# Patient Record
Sex: MALE | Race: WHITE | NOT HISPANIC OR LATINO | Employment: FULL TIME | URBAN - METROPOLITAN AREA
[De-identification: names, ages, dates, MRNs, and addresses within clinical notes are randomized per-mention and may not be internally consistent; named-entity substitution may affect disease eponyms.]

---
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
## 2022-01-17 DIAGNOSIS — F90.9 ATTENTION DEFICIT HYPERACTIVITY DISORDER (ADHD), UNSPECIFIED ADHD TYPE: ICD-10-CM

## 2022-01-18 RX ORDER — METHYLPHENIDATE HYDROCHLORIDE 36 MG/1
TABLET ORAL
Qty: 60 TABLET | Refills: 0 | Status: SHIPPED | OUTPATIENT
Start: 2022-01-18 | End: 2022-02-10

## 2022-01-18 NOTE — TELEPHONE ENCOUNTER
Pending Prescriptions:                       Disp   Refills    methylphenidate (CONCERTA) 36 MG CR [id number] tab*0            Sig: [METHYLPHENIDATE HCL 36 MG CR TABLET] Take 2 tabs           PO daily    Last visit 8/10/21  Last fill 12/21/21

## 2022-02-10 ENCOUNTER — VIRTUAL VISIT (OUTPATIENT)
Dept: PEDIATRICS | Facility: CLINIC | Age: 22
End: 2022-02-10
Payer: COMMERCIAL

## 2022-02-10 DIAGNOSIS — F90.9 ATTENTION DEFICIT HYPERACTIVITY DISORDER (ADHD), UNSPECIFIED ADHD TYPE: Primary | ICD-10-CM

## 2022-02-10 DIAGNOSIS — F41.9 ANXIETY: ICD-10-CM

## 2022-02-10 PROCEDURE — 99213 OFFICE O/P EST LOW 20 MIN: CPT | Mod: GT | Performed by: PEDIATRICS

## 2022-02-10 RX ORDER — METHYLPHENIDATE HYDROCHLORIDE 10 MG/1
TABLET ORAL
Qty: 60 TABLET | Refills: 0 | Status: SHIPPED | OUTPATIENT
Start: 2022-02-10 | End: 2022-04-21

## 2022-02-10 RX ORDER — METHYLPHENIDATE HYDROCHLORIDE 36 MG/1
TABLET ORAL
Qty: 60 TABLET | Refills: 0 | Status: SHIPPED | OUTPATIENT
Start: 2022-02-10 | End: 2022-04-21

## 2022-02-10 ASSESSMENT — ANXIETY QUESTIONNAIRES
3. WORRYING TOO MUCH ABOUT DIFFERENT THINGS: NOT AT ALL
1. FEELING NERVOUS, ANXIOUS, OR ON EDGE: SEVERAL DAYS
6. BECOMING EASILY ANNOYED OR IRRITABLE: NOT AT ALL
GAD7 TOTAL SCORE: 2
2. NOT BEING ABLE TO STOP OR CONTROL WORRYING: NOT AT ALL
5. BEING SO RESTLESS THAT IT IS HARD TO SIT STILL: SEVERAL DAYS
4. TROUBLE RELAXING: NOT AT ALL
7. FEELING AFRAID AS IF SOMETHING AWFUL MIGHT HAPPEN: NOT AT ALL
IF YOU CHECKED OFF ANY PROBLEMS ON THIS QUESTIONNAIRE, HOW DIFFICULT HAVE THESE PROBLEMS MADE IT FOR YOU TO DO YOUR WORK, TAKE CARE OF THINGS AT HOME, OR GET ALONG WITH OTHER PEOPLE: NOT DIFFICULT AT ALL

## 2022-02-10 ASSESSMENT — PATIENT HEALTH QUESTIONNAIRE - PHQ9: SUM OF ALL RESPONSES TO PHQ QUESTIONS 1-9: 2

## 2022-02-10 NOTE — PATIENT INSTRUCTIONS
No changes to medications today    For anxiety - continue on Sertraline 50 mg daily    For ADHD, continue on Concerta 72 mg daily on work days, and if needed, short acting methylphenidate 2 tabs (20 mg)     If all is going well, next med check in six months   Please return sooner with any concerns

## 2022-02-10 NOTE — PROGRESS NOTES
Berry is a 21 year old who is being evaluated via a billable video visit.      How would you like to obtain your AVS? MyChart  If the video visit is dropped, the invitation should be resent by: Text to cell phone: [phone]  Will anyone else be joining your video visit? No      Video Start Time: 5:26 PM    Assessment & Plan     (F90.9) Attention deficit hyperactivity disorder (ADHD), unspecified ADHD type  (primary encounter diagnosis)  Plan: methylphenidate (RITALIN) 10 MG tablet,         methylphenidate (CONCERTA) 36 MG CR tablet    Berry is doing well  Continue on same medication - Concerta 72 mg daily on work days or change to short acting methylphenidate 20 mg on days when only needs meds on board for shorter period of time - depending upon day's activities    (F41.9) Anxiety    Berry feels he is doing well  No changes today  Continue Sertraline 50 mg daily    If all is going well, next med check in 6 months (sooner PRN)      18 minutes spent on the date of the encounter doing chart review, patient visit, documentation and discussion with family        See Patient Instructions    Return in about 6 months (around 8/10/2022).    Luiza Orourke MD  [facility]   Berry is a 21 year old who presents for the following health issues  accompanied by his mother.    HPI   Video visit today to discuss anxiety and ADHD  Last med check was about six months ago  At that time he was doing well in terms of anxiety and we made no changes (Sertraline 50 mg daily)  In terms of ADHD, he was also doing well and we made no changes (Concerta 72 mg daily for days when long-acting needed, and other days if only short acting needed, take short acting methylphenidate 20 mg    Working at [employer]  Planning to go to a program that requires a full year of work experience  Going to program to be  at a assisted or [employer]     In terms of anxiety, is still taking  Sertraline 50 mg daily  Feels like it works well for him still  Overall pretty happy most of the time   Also feels like he is not too anxious - just the same as most people    In terms of ADHD, usually takes Concerta before work  Starts work at 4:30 in afternoon so takes the Concerta around 3:50  Works 12 hour shift until 4:30am    On days when he doesn't have work, he sometimes doesn't take any medication and sometimes takes the short acting methylphenidate  Feels as though these medications are both working well for him still     When he's home and doesn't take the medication, mom definitely notices that he has a lot of energy but even so         Objective           Vitals:  No vitals were obtained today due to virtual visit.    Physical Exam   GENERAL: Healthy, alert and no distress  EYES: Eyes grossly normal to inspection.  No discharge or erythema, or obvious scleral/conjunctival abnormalities.  SKIN: Visible skin clear. No significant rash, abnormal pigmentation or lesions.  NEURO: Cranial nerves grossly intact.  Mentation and speech appropriate for age.  PSYCH: Mentation appears normal, affect normal/bright, judgement and insight intact, normal speech and appearance well-groomed.              Video-Visit Details    Type of service:  Video Visit    Video End Time:5:41 PM    Originating Location (pt. Location): Home    Distant Location (provider location):  [facility]     Platform used for Video Visit: [employer]

## 2022-02-11 ASSESSMENT — ANXIETY QUESTIONNAIRES: GAD7 TOTAL SCORE: 2

## 2022-04-03 ENCOUNTER — HEALTH MAINTENANCE LETTER (OUTPATIENT)
Age: 22
End: 2022-04-03

## 2022-04-20 DIAGNOSIS — F90.9 ATTENTION DEFICIT HYPERACTIVITY DISORDER (ADHD), UNSPECIFIED ADHD TYPE: ICD-10-CM

## 2022-04-21 RX ORDER — METHYLPHENIDATE HYDROCHLORIDE 10 MG/1
TABLET ORAL
Qty: 60 TABLET | Refills: 0 | Status: SHIPPED | OUTPATIENT
Start: 2022-04-21 | End: 2023-06-29

## 2022-04-21 RX ORDER — METHYLPHENIDATE HYDROCHLORIDE 36 MG/1
TABLET ORAL
Qty: 60 TABLET | Refills: 0 | Status: SHIPPED | OUTPATIENT
Start: 2022-04-21 | End: 2022-06-22

## 2022-06-21 DIAGNOSIS — F90.9 ATTENTION DEFICIT HYPERACTIVITY DISORDER (ADHD), UNSPECIFIED ADHD TYPE: ICD-10-CM

## 2022-06-21 NOTE — TELEPHONE ENCOUNTER
Refill request for medication: Pending Prescriptions:                       Disp   Refills    methylphenidate (CONCERTA) 36 MG CR [id number] tab*0            Sig: [METHYLPHENIDATE HCL 36 MG CR TABLET] Take 2 tabs           PO daily      Last visit addressing this medication: 2/10/22  Follow up plan 6  months  Last refill on 4/21/22, quantity #60   CSA completed 10/10/2019  Date PDMP was last reviewed 9/7/21    Appointment: Not due   Appointment recommended at least every 3 months for opioid prescriptions. Appointment recommended at least every 6 months for ADHD medications.    SINCERE LOPEZ on 6/21/2022 at 9:09 AM

## 2022-06-22 RX ORDER — METHYLPHENIDATE HYDROCHLORIDE 36 MG/1
TABLET ORAL
Qty: 60 TABLET | Refills: 0 | Status: SHIPPED | OUTPATIENT
Start: 2022-06-22 | End: 2022-08-11

## 2022-06-22 NOTE — TELEPHONE ENCOUNTER
Mom calling back patient out of medications.  Refill sent through yesterday for covering for [place].  Please advise. SINCERE LOPEZ on 6/22/2022 at 7:00 AM

## 2022-08-10 DIAGNOSIS — F90.9 ATTENTION DEFICIT HYPERACTIVITY DISORDER (ADHD), UNSPECIFIED ADHD TYPE: ICD-10-CM

## 2022-08-10 NOTE — TELEPHONE ENCOUNTER
Last visit addressing this medication: 2/10/22  Follow up plan 6  months  Last refill on 06/22/2022 , quantity #60   CSA completed 10/10/2019  Date PDMP was last reviewed 9/7/21

## 2022-08-11 RX ORDER — METHYLPHENIDATE HYDROCHLORIDE 36 MG/1
TABLET ORAL
Qty: 60 TABLET | Refills: 0 | Status: SHIPPED | OUTPATIENT
Start: 2022-08-11 | End: 2022-10-18

## 2022-08-11 NOTE — TELEPHONE ENCOUNTER
Please let Berry / mom know that I sent in his refill for Concerta but he is also due now for a follow-up visit.    I would like to help him to please establish care with an adult provider.  He could see Gerardo Campbell or  nurse practitioners at [facility] and Beena Pritchett.  Please help him get scheduled with one of these providers - thank you

## 2022-09-16 DIAGNOSIS — F41.9 ANXIETY: ICD-10-CM

## 2022-09-16 NOTE — TELEPHONE ENCOUNTER
"Last Written Prescription Date:  11/21/21  Last Fill Quantity: 90,  # refills: 2   Last office visit provider:  2/10/22     Requested Prescriptions   Pending Prescriptions Disp Refills     sertraline (ZOLOFT) 50 MG tablet [Pharmacy Med Name: SERTRALINE HCL 50 MG TABLET] 90 tablet 2     Sig: TAKE 1 TABLET BY MOUTH EVERY DAY       SSRIs Protocol Passed - 9/16/2022 12:49 AM        Passed - Recent (12 mo) or future (30 days) visit within the authorizing provider's specialty     Patient has had an office visit with the authorizing provider or a provider within the authorizing providers department within the previous 12 mos or has a future within next 30 days. See \"Patient Info\" tab in inbasket, or \"Choose Columns\" in Meds & Orders section of the refill encounter.              Passed - Medication is active on med list        Passed - Patient is age 18 or older             Estrellita Browning RN 09/16/22 2:05 PM  "

## 2022-10-01 ENCOUNTER — HEALTH MAINTENANCE LETTER (OUTPATIENT)
Age: 22
End: 2022-10-01

## 2022-10-18 DIAGNOSIS — F90.9 ATTENTION DEFICIT HYPERACTIVITY DISORDER (ADHD), UNSPECIFIED ADHD TYPE: ICD-10-CM

## 2022-10-18 RX ORDER — METHYLPHENIDATE HYDROCHLORIDE 36 MG/1
TABLET ORAL
Qty: 60 TABLET | Refills: 0 | Status: SHIPPED | OUTPATIENT
Start: 2022-10-18 | End: 2022-12-27

## 2022-11-17 ENCOUNTER — VIRTUAL VISIT (OUTPATIENT)
Dept: PEDIATRICS | Facility: CLINIC | Age: 22
End: 2022-11-17
Payer: COMMERCIAL

## 2022-11-17 DIAGNOSIS — F90.9 ATTENTION DEFICIT HYPERACTIVITY DISORDER (ADHD), UNSPECIFIED ADHD TYPE: Primary | ICD-10-CM

## 2022-11-17 DIAGNOSIS — F41.9 ANXIETY: ICD-10-CM

## 2022-11-17 PROCEDURE — 99214 OFFICE O/P EST MOD 30 MIN: CPT | Mod: GT | Performed by: PEDIATRICS

## 2022-11-17 ASSESSMENT — ANXIETY QUESTIONNAIRES
GAD7 TOTAL SCORE: 2
1. FEELING NERVOUS, ANXIOUS, OR ON EDGE: SEVERAL DAYS
5. BEING SO RESTLESS THAT IT IS HARD TO SIT STILL: SEVERAL DAYS
GAD7 TOTAL SCORE: 2
4. TROUBLE RELAXING: NOT AT ALL
6. BECOMING EASILY ANNOYED OR IRRITABLE: NOT AT ALL
7. FEELING AFRAID AS IF SOMETHING AWFUL MIGHT HAPPEN: NOT AT ALL
GAD7 TOTAL SCORE: 2
7. FEELING AFRAID AS IF SOMETHING AWFUL MIGHT HAPPEN: NOT AT ALL
2. NOT BEING ABLE TO STOP OR CONTROL WORRYING: NOT AT ALL
3. WORRYING TOO MUCH ABOUT DIFFERENT THINGS: NOT AT ALL

## 2022-11-17 ASSESSMENT — PATIENT HEALTH QUESTIONNAIRE - PHQ9
10. IF YOU CHECKED OFF ANY PROBLEMS, HOW DIFFICULT HAVE THESE PROBLEMS MADE IT FOR YOU TO DO YOUR WORK, TAKE CARE OF THINGS AT HOME, OR GET ALONG WITH OTHER PEOPLE: NOT DIFFICULT AT ALL
SUM OF ALL RESPONSES TO PHQ QUESTIONS 1-9: 3
SUM OF ALL RESPONSES TO PHQ QUESTIONS 1-9: 3

## 2022-11-17 NOTE — PATIENT INSTRUCTIONS
No changes today  For ADHD - continue taking Concerta 72 mg in the morning on days when you need it (ok to skip this medication if not needed)  Also continue to use short acting methylphenidate instead of the Concerta if you only need medication support for a short time (up to 3 hours usually)    For anxiety - will continue on Sertraline 50 mg daily  We talked about that it would be best to take this medication every day - I would encourage you to try to remember to take it daily as this will work best over time  Refills sent to pharmacy    If all is going well, next visit in 6 months  We talked about transition to an adult provider    Providers who see adults and are currently taking new patients:  Dr. Gerardo Campbell - med/peds at [facility]   Beena Pritchett - NP at [facility]   Dr. Claire Smith - DEBBIE at [facility]   Kena Cast - KEVIN at [facility]

## 2022-11-17 NOTE — PROGRESS NOTES
Answers for HPI/ROS submitted by the patient on 11/17/2022  If you checked off any problems, how difficult have these problems made it for you to do your work, take care of things at home, or get along with other people?: Not difficult at all  PHQ9 TOTAL SCORE: 3  CARI 7 TOTAL SCORE: 2  What is the reason for your visit today? : MED Check  How many servings of fruits and vegetables do you eat daily?: 2-3  On average, how many sweetened beverages do you drink each day (Examples: soda, juice, sweet tea, etc.  Do NOT count diet or artificially sweetened beverages)?: 1  How many minutes a day do you exercise enough to make your heart beat faster?: 30 to 60  How many days a week do you exercise enough to make your heart beat faster?: 3 or less  How many days per week do you miss taking your medication?: 3  What makes it hard for you to take your medication every day?: stan Smart is a 22 year old who is being evaluated via a billable video visit.      How would you like to obtain your AVS? TapFunderhart  If the video visit is dropped, the invitation should be resent by: Text to cell phone: [phone]  Will anyone else be joining your video visit? mother          Assessment & Plan     (F90.9) Attention deficit hyperactivity disorder (ADHD), unspecified ADHD type  (primary encounter diagnosis)  For ADHD - continue taking Concerta 72 mg in the morning on days when you need it (ok to skip this medication if not needed)  Also continue to use short acting methylphenidate instead of the Concerta if you only need medication support for a short time (up to 3 hours usually)    (F41.9) Anxiety  Plan: sertraline (ZOLOFT) 50 MG tablet  For anxiety - will continue on Sertraline 50 mg daily  We talked about that it would be best to take this medication every day - I would encourage you to try to remember to take it daily as this will work best over time  Refills sent to pharmacy    If all is going well, next visit in 6 months  We talked  about transition to an adult provider    Providers who see adults:  Dr. Gerardo Campbell - med/peds at [facility]   Beena Pritchett - NP at [facility]   Dr. Claire Smith - FM at [facility]   Kena Cast - NP at [facility]           30 minutes spent on the date of the encounter doing chart review, patient visit and documentation        See Patient Instructions    Return in about 6 months (around 5/17/2023).    Luiza Orourke MD  [facility] DANIELA Smart is a 22 year old accompanied by his mother, presenting for the following health issues:  No chief complaint on file.      History of Present Illness       Reason for visit:  MED Check    He eats 2-3 servings of fruits and vegetables daily.He consumes 1 sweetened beverage(s) daily.He exercises with enough effort to increase his heart rate 30 to 60 minutes per day.  He exercises with enough effort to increase his heart rate 3 or less days per week. He is missing 3 dose(s) of medications per week.  He is not taking prescribed medications regularly due to other.    Today's PHQ-9         PHQ-9 Total Score: 3    PHQ-9 Q9 Thoughts of better off dead/self-harm past 2 weeks :   Not at all    How difficult have these problems made it for you to do your work, take care of things at home, or get along with other people: Not difficult at all  Today's CARI-7 Score: 2     Video visit today for follow-up related to ADHD and anxiety  Currently taking Concerta 72 mg in the morning  On days when there is not much going on, he will sometimes use short acting methylphenidate 10-20 mg    Overall he is doing well with this but does struggle with appetite suppression so sometimes doesn't take hte medicine on days when he wants to eat more  Take the Concerta always on work days (3 days per week) plus another 1-2 other days as well  On other days he either takes no medicaiton or sometimes takes the short acting medication  occasionally  Still feeling like the Concerta 72 mg is working well    For anxiety - continues on Sertraline 50 mg daily    Last med check was Feb 2022 (9 months ago) - at that time he was doing well and we made no changes    For anxiety, still taking sertraline 50 mg - probably takes 4-5 days a week - sometimes he forgets  Doesn't notice a big change if he forgets the medication    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    Physical Exam     GENERAL: Healthy, alert and no distress  EYES: Eyes grossly normal to inspection.  No discharge or erythema, or obvious scleral/conjunctival abnormalities.  RESP: No audible wheeze, cough, or visible cyanosis.  No visible retractions or increased work of breathing.    SKIN: No visible rash or other skin changes.  NEURO: Cranial nerves grossly intact.  Mentation and speech appropriate for age.  PSYCH: Mentation appears normal, affect normal/bright, judgement and insight intact, normal speech and appearance well-groomed.        Video-Visit Details    Video Start Time: 3:21    Type of service:  Video Visit    Video End Time:3:40 PM    Originating Location (pt. Location): Home        Distant Location (provider location):  On-site    Platform used for Video Visit: Jasmin Orourke MD

## 2022-12-27 DIAGNOSIS — F90.9 ATTENTION DEFICIT HYPERACTIVITY DISORDER (ADHD), UNSPECIFIED ADHD TYPE: ICD-10-CM

## 2022-12-27 RX ORDER — METHYLPHENIDATE HYDROCHLORIDE 36 MG/1
TABLET ORAL
Qty: 60 TABLET | Refills: 0 | Status: SHIPPED | OUTPATIENT
Start: 2022-12-27 | End: 2023-02-14

## 2022-12-27 NOTE — TELEPHONE ENCOUNTER
Refill request for medication: Pending Prescriptions:                       Disp   Refills    methylphenidate (CONCERTA) 36 MG CR [id number] tab*0            Sig: [METHYLPHENIDATE HCL 36 MG CR TABLET] Take 2 tabs           PO daily      Last visit addressing this medication: 11/17/22  Follow up plan 6  months  Last refill on 10/18/22, quantity #60   CSA completed 10/10/2019  Date PDMP was last reviewed 8/11/22    Appointment: Not due   Appointment recommended at least every 3 months for opioid prescriptions. Appointment recommended at least every 6 months for ADHD medications.    SINCERE LOPEZ on 12/27/2022 at 8:16 AM

## 2023-02-14 DIAGNOSIS — F90.9 ATTENTION DEFICIT HYPERACTIVITY DISORDER (ADHD), UNSPECIFIED ADHD TYPE: ICD-10-CM

## 2023-02-14 RX ORDER — METHYLPHENIDATE HYDROCHLORIDE 36 MG/1
TABLET ORAL
Qty: 60 TABLET | Refills: 0 | Status: SHIPPED | OUTPATIENT
Start: 2023-02-14 | End: 2023-03-28

## 2023-02-14 NOTE — TELEPHONE ENCOUNTER
Please let Berry know that refill was sent to pharmacy.   Also - please remind him to please schedule a follow-up visit with an adult provider - he will be due to be seen in May.  Thanks!    Providers who see adults:  Dr. Gerardo Campbell - bucky/peds at [facility]   Beena Pritchett - NP at [facility]   Dr. Claire Smith - DEBBIE at [facility]   Kena Cast - KEVIN at [facility]

## 2023-02-14 NOTE — TELEPHONE ENCOUNTER
Last visit addressing this medication: 11/17/22  Follow up plan 6  months  Last refill on 112/27/2022, quantity #60   CSA completed 10/10/2019

## 2023-03-28 DIAGNOSIS — F90.9 ATTENTION DEFICIT HYPERACTIVITY DISORDER (ADHD), UNSPECIFIED ADHD TYPE: ICD-10-CM

## 2023-03-28 RX ORDER — METHYLPHENIDATE HYDROCHLORIDE 36 MG/1
TABLET ORAL
Qty: 60 TABLET | Refills: 0 | Status: SHIPPED | OUTPATIENT
Start: 2023-03-28 | End: 2023-12-11

## 2023-03-28 NOTE — TELEPHONE ENCOUNTER
Refill request for medication: Pending Prescriptions:                       Disp   Refills    methylphenidate (CONCERTA) 36 MG CR [id number] tab*0            Sig: [METHYLPHENIDATE HCL 36 MG CR TABLET] Take 2 tabs           PO daily      Last visit addressing this medication: 11/17/22  Follow up plan 6  months  Last refill on 2/14/23, quantity #60   CSA completed 10/10/2019  Date PDMP was last reviewed 2/14/23    Appointment: Not due   Appointment recommended at least every 3 months for opioid prescriptions. Appointment recommended at least every 6 months for ADHD medications.    SINCERE LOPEZ on 3/28/2023 at 8:02 AM

## 2023-05-14 ENCOUNTER — HEALTH MAINTENANCE LETTER (OUTPATIENT)
Age: 23
End: 2023-05-14

## 2023-05-22 DIAGNOSIS — L70.0 ACNE VULGARIS: Primary | ICD-10-CM

## 2023-05-23 NOTE — TELEPHONE ENCOUNTER
05/23/23  LM for pt to schedule an appt to establish care with an open provider: Mitzy Gomez, Beena Pritchett, Carmen Peña.  Tiana

## 2023-05-23 NOTE — TELEPHONE ENCOUNTER
I received a refill request for topical Clina lotion for Berry but I don't see that I've prescribed this medication for him recently.  He should please schedule a visit with an adult provider to discuss this refill - I had also recommended that he establish care with an adult provider for ADHD management and I don't see an appointment for that scheduled yet - please let Berry know that he needs to be seen and help him get scheduled with an adult provider.  Thanks.

## 2023-05-23 NOTE — TELEPHONE ENCOUNTER
"Routing refill request to provider for review/approval because:  Medication is reported/historical    Last office visit provider:  11/17/2022     Requested Prescriptions   Pending Prescriptions Disp Refills     clindamycin (CLEOCIN T) 1 % external lotion [Pharmacy Med Name: CLINDAMYCIN PHOSP 1% LOTION] 60 mL 5     Sig: APPLY TO AFFECTED AREA 1 TIME DAILY       Topical Acne Medications Protocol Failed - 5/22/2023  1:16 PM        Failed - Medication is active on med list        Passed - Patient is 12 years of age or older        Passed - Recent (12 mo) or future (30 days) visit within the authorizing provider's specialty     Patient has had an office visit with the authorizing provider or a provider within the authorizing providers department within the previous 12 mos or has a future within next 30 days. See \"Patient Info\" tab in inbasket, or \"Choose Columns\" in Meds & Orders section of the refill encounter.                   Holly Davidson RN 05/23/23 12:27 PM  "

## 2023-05-24 NOTE — TELEPHONE ENCOUNTER
05/24/23  LM for pt to schedule an appt to establish care with an open provider: Mitzy Gomez, Beena Pritchett, Carmen Peña.  Tiana

## 2023-05-25 NOTE — TELEPHONE ENCOUNTER
05/25/23  LM for pt to schedule an appt to establish care with an open provider: Mitzy Gomez, Beena Pritchett, Carmen Peña.  And sent Aptela message.  Tiana

## 2023-06-02 ENCOUNTER — TELEPHONE (OUTPATIENT)
Dept: PEDIATRICS | Facility: CLINIC | Age: 23
End: 2023-06-02
Payer: COMMERCIAL

## 2023-06-02 NOTE — TELEPHONE ENCOUNTER
06/02/23  General Call      Reason for Call:  Continuation of Care concerns    What are your questions or concerns:    Mom called regarding acne medication refill. Pt gave writer verbal consent to communicate over the phone. Per mom, pt has learning disabilities so mom does most of med/ doctor communications. Writer advised pt and mom to sign a written consent to communicate in clinic. They stated they would as soon as possible. Relayed to pt's mom that we have tried to reach out to Ramiro to establish care with a new provider. Mom was unaware. Mom would like suggestions for transitioning care. Mom stated Dr. Orourke suggested Dr. Campbell, but Adrian is booked out until October. Mom wants another suggestion for someone that can get pt in sooner that might be a good fit.    Pt's mom also requests another one time refill on the acne medication and the Concerta extended release. Per mom, pt is not currently taking the methylphenidate 10mg tablets and would like this discontinued.    As there is no consent to communicate on file, pt's mom was notified that Ramiro may receive a call back instead of her.    Date of last appointment with provider: 11/17/22    Could we send this information to you in [place] or would you prefer to receive a phone call?:   Patient would prefer a phone call   Okay to leave a detailed message?: Yes at Home number on file [phone] (home)

## 2023-06-05 NOTE — TELEPHONE ENCOUNTER
Please let Berry know that he needs to schedule an appointment with an adult provider for ongoing care.      Any family medicine doc at [place] or [facility] would be fine.  Some that I am aware of:    Beena Pritchett - NP at [facility]   Dr. Claire Smith - DEBBIE at [facility]   Kena Cast - KEVIN at [facility]   ([facility] providers are currently working out of [facility])    There is also a new provider starting very soon at [place] in Family Med - Dr. Lpoez - he would be a good option as well    Once Berry has his appointment scheduled, I would be willing to provide to refill to get him through until he can be seen in clinic    Thanks!

## 2023-06-05 NOTE — TELEPHONE ENCOUNTER
Called mom with information for providers for Berry to establish care with. SINCERE LOPEZ on 6/5/2023 at 2:22 PM

## 2023-06-06 NOTE — TELEPHONE ENCOUNTER
Routing refill request to provider for review/approval because:  LOV 11/17/2022        Topical Acne Medications Protocol Failed     Medication is active on med list        GEORGE Vasquez  [facility]

## 2023-06-07 RX ORDER — CLINDAMYCIN PHOSPHATE 10 UG/ML
LOTION TOPICAL
Qty: 60 ML | Refills: 5 | Status: SHIPPED | OUTPATIENT
Start: 2023-06-07 | End: 2023-06-29

## 2023-06-26 RX ORDER — FLUOXETINE 10 MG/1
CAPSULE ORAL
COMMUNITY
Start: 2021-07-01 | End: 2023-06-29

## 2023-06-29 ENCOUNTER — OFFICE VISIT (OUTPATIENT)
Dept: FAMILY MEDICINE | Facility: CLINIC | Age: 23
End: 2023-06-29
Payer: COMMERCIAL

## 2023-06-29 VITALS
HEIGHT: 69 IN | TEMPERATURE: 97.2 F | BODY MASS INDEX: 20.88 KG/M2 | OXYGEN SATURATION: 97 % | DIASTOLIC BLOOD PRESSURE: 72 MMHG | SYSTOLIC BLOOD PRESSURE: 100 MMHG | WEIGHT: 141 LBS | HEART RATE: 42 BPM

## 2023-06-29 DIAGNOSIS — F90.2 ATTENTION DEFICIT HYPERACTIVITY DISORDER (ADHD), COMBINED TYPE: ICD-10-CM

## 2023-06-29 DIAGNOSIS — F41.9 ANXIETY: ICD-10-CM

## 2023-06-29 DIAGNOSIS — L70.0 ACNE VULGARIS: ICD-10-CM

## 2023-06-29 PROCEDURE — 99214 OFFICE O/P EST MOD 30 MIN: CPT | Performed by: FAMILY MEDICINE

## 2023-06-29 RX ORDER — METHYLPHENIDATE HYDROCHLORIDE 36 MG/1
72 TABLET ORAL EVERY MORNING
Qty: 60 TABLET | Refills: 0 | Status: SHIPPED | OUTPATIENT
Start: 2023-07-30 | End: 2023-08-29

## 2023-06-29 RX ORDER — CLINDAMYCIN PHOSPHATE 10 UG/ML
LOTION TOPICAL
Qty: 60 ML | Refills: 5 | Status: SHIPPED | OUTPATIENT
Start: 2023-06-29

## 2023-06-29 RX ORDER — METHYLPHENIDATE HYDROCHLORIDE 36 MG/1
72 TABLET ORAL EVERY MORNING
Qty: 60 TABLET | Refills: 0 | Status: SHIPPED | OUTPATIENT
Start: 2023-08-30 | End: 2023-09-29

## 2023-06-29 RX ORDER — METHYLPHENIDATE HYDROCHLORIDE 36 MG/1
72 TABLET ORAL EVERY MORNING
Qty: 60 TABLET | Refills: 0 | Status: SHIPPED | OUTPATIENT
Start: 2023-06-29 | End: 2023-07-29

## 2023-06-29 RX ORDER — METHYLPHENIDATE HYDROCHLORIDE 36 MG/1
72 TABLET ORAL EVERY MORNING
Qty: 60 TABLET | Refills: 0 | Status: CANCELLED | OUTPATIENT
Start: 2023-06-29

## 2023-06-29 NOTE — PROGRESS NOTES
Assessment & Plan     Attention deficit hyperactivity disorder (ADHD), combined type  Stable.  Refilled Concerta 72 mg daily for the next 3 months, patient is to call in 3 months for additional 3 months of refills and we will see him back in 6 months.  - methylphenidate (CONCERTA) 36 MG CR tablet; Take 2 tablets (72 mg) by mouth every morning for 30 days  - methylphenidate (CONCERTA) 36 MG CR tablet; Take 2 tablets (72 mg) by mouth every morning for 30 days  - methylphenidate (CONCERTA) 36 MG CR tablet; Take 2 tablets (72 mg) by mouth every morning for 30 days    Anxiety  Controlled.  Continue sertraline.  - sertraline (ZOLOFT) 50 MG tablet; Take 1 tablet (50 mg) by mouth daily    Acne vulgaris  Acne well controlled.  Continue clindamycin.  - clindamycin (CLEOCIN T) 1 % external lotion; APPLY TO FACE 1 TIME DAILY                 Max Lopez MD  [facility] DANIELA Smart is a 22 year old, presenting for the following health issues:  Establish Care and Recheck Medication        6/29/2023     1:27 PM   Additional Questions   Roomed by Nicolas X     History of Present Illness       Reason for visit:  Med check    He eats 2-3 servings of fruits and vegetables daily.He consumes 4 sweetened beverage(s) daily.He exercises with enough effort to increase his heart rate 30 to 60 minutes per day.  He exercises with enough effort to increase his heart rate 3 or less days per week. He is missing 2 dose(s) of medications per week.     Anxiety: Since age 18 patient has had generalized and social anxiety.  He was initially started on fluoxetine and then later switched to sertraline, which has been helpful at reducing his symptoms.  He states he is doing well and does not have any abnormal difficulty being in social situations since starting sertraline.    ADHD: Since age 10 was diagnosed with combined ADHD and has been on Concentra since.  He was tried on 1 dose of Ritalin but caused  "significant side effects with what mother describes as paranoid thoughts.  Patient has been on Concerta 72 mg without significant side effects, he takes medication 5 days a week and takes weekends off.    Acne: Patient has been doing well with clindamycin lotion and is requesting a refill.  Patient uses over-the-counter facial cleanser in the shower.              Review of Systems   Constitutional, HEENT, cardiovascular, pulmonary, gi and gu systems are negative, except as otherwise noted.      Objective    /72 (BP Location: Left arm, Patient Position: Sitting, Cuff Size: Adult Regular)   Pulse (!) 42   Temp 97.2  F (36.2  C) (Temporal)   Ht 1.74 m (5' 8.5\")   Wt 64 kg (141 lb)   SpO2 97%   BMI 21.13 kg/m    Body mass index is 21.13 kg/m .  Physical Exam   GENERAL: healthy, alert and no distress  MS: no gross musculoskeletal defects noted, no edema                    "

## 2023-12-11 DIAGNOSIS — F90.9 ATTENTION DEFICIT HYPERACTIVITY DISORDER (ADHD), UNSPECIFIED ADHD TYPE: ICD-10-CM

## 2023-12-11 NOTE — TELEPHONE ENCOUNTER
Refill Request  Medication name: Pending Prescriptions:                       Disp   Refills    methylphenidate HCl ER (OSM) (CONCERTA) 3*60 tab*0            Sig: [METHYLPHENIDATE HCL 36 MG CR TABLET] Take 2 tabs           PO daily    Who prescribed the medication: Max Lopez  Last refill on medication: 8/30/23  Requested Pharmacy: [facility]  Last appointment with PCP: 6/29/23  Next appointment: Not due

## 2023-12-12 RX ORDER — METHYLPHENIDATE HYDROCHLORIDE 36 MG/1
TABLET ORAL
Qty: 60 TABLET | Refills: 0 | Status: SHIPPED | OUTPATIENT
Start: 2023-12-12 | End: 2024-02-20

## 2023-12-12 NOTE — TELEPHONE ENCOUNTER
Please notify patient that has been about 6 months since her last visit.  I did send in a 60 tablet refill of methylphenidate. Please have him follow up for an appointment when due for his next refill.    Max Lopez MD

## 2023-12-12 NOTE — TELEPHONE ENCOUNTER
Attempted to call patient regarding lab results/provider message. Left message and callback # for [facility]  Upon patient call back, OKAY to relay results/message per provider.    [facility], December 12, 2023, 9:03 AM

## 2024-02-20 DIAGNOSIS — F90.9 ATTENTION DEFICIT HYPERACTIVITY DISORDER (ADHD), UNSPECIFIED ADHD TYPE: ICD-10-CM

## 2024-02-20 RX ORDER — METHYLPHENIDATE HYDROCHLORIDE 36 MG/1
TABLET ORAL
Qty: 60 TABLET | Refills: 0 | Status: SHIPPED | OUTPATIENT
Start: 2024-02-20 | End: 2024-05-15

## 2024-02-20 NOTE — TELEPHONE ENCOUNTER
Last visit addressing this medication: 6/29/2023  Follow up plan   6 months  Last refill on 12/11/2023, quantity #60   CSA completed not on file       Appointment: Left message for patient and sent My chart messaged   Appointment recommended at least every 3 months for opioid prescriptions. Appointment recommended at least every 6 months for ADHD medications.

## 2024-05-15 DIAGNOSIS — F90.9 ATTENTION DEFICIT HYPERACTIVITY DISORDER (ADHD), UNSPECIFIED ADHD TYPE: ICD-10-CM

## 2024-05-15 NOTE — TELEPHONE ENCOUNTER
Refill request for medication: Pending Prescriptions:                       Disp   Refills    methylphenidate HCl ER (OSM) (CONCERTA) 3*60 tab*0            Sig: [METHYLPHENIDATE HCL 36 MG CR TABLET] Take 2 tabs           PO daily      Last visit addressing this medication: 6/29/23  Follow up plan 6  months  Last refill on 2/20/24, quantity #60   CSA completed 4/5/2019  Date PDMP was last reviewed 3/28/23    Appointment:  Has not made follow up appointment yet.  Several messages left to make follow up appointment.      Appointment recommended at least every 3 months for opioid prescriptions. Appointment recommended at least every 6 months for ADHD medications.    SINCERE LOPEZ on 5/15/2024 at 6:53 AM

## 2024-05-16 RX ORDER — METHYLPHENIDATE HYDROCHLORIDE 36 MG/1
TABLET ORAL
Qty: 60 TABLET | Refills: 0 | Status: SHIPPED | OUTPATIENT
Start: 2024-05-16 | End: 2024-06-20

## 2024-05-16 NOTE — TELEPHONE ENCOUNTER
1 month refill sent to pharmacy.  Please have patient schedule appointment for further refills.    Max Lopez MD

## 2024-05-18 ENCOUNTER — HEALTH MAINTENANCE LETTER (OUTPATIENT)
Age: 24
End: 2024-05-18

## 2024-06-20 ENCOUNTER — VIRTUAL VISIT (OUTPATIENT)
Dept: FAMILY MEDICINE | Facility: CLINIC | Age: 24
End: 2024-06-20
Payer: COMMERCIAL

## 2024-06-20 DIAGNOSIS — F90.2 ATTENTION DEFICIT HYPERACTIVITY DISORDER (ADHD), COMBINED TYPE: Primary | ICD-10-CM

## 2024-06-20 DIAGNOSIS — F41.9 ANXIETY: ICD-10-CM

## 2024-06-20 PROCEDURE — G2211 COMPLEX E/M VISIT ADD ON: HCPCS | Mod: 95 | Performed by: FAMILY MEDICINE

## 2024-06-20 PROCEDURE — 99214 OFFICE O/P EST MOD 30 MIN: CPT | Mod: 95 | Performed by: FAMILY MEDICINE

## 2024-06-20 RX ORDER — METHYLPHENIDATE HYDROCHLORIDE 36 MG/1
72 TABLET ORAL EVERY MORNING
Qty: 60 TABLET | Refills: 0 | Status: SHIPPED | OUTPATIENT
Start: 2024-07-20 | End: 2024-08-19

## 2024-06-20 RX ORDER — METHYLPHENIDATE HYDROCHLORIDE 36 MG/1
72 TABLET ORAL EVERY MORNING
Qty: 60 TABLET | Refills: 0 | Status: SHIPPED | OUTPATIENT
Start: 2024-08-19 | End: 2024-09-18

## 2024-06-20 RX ORDER — METHYLPHENIDATE HYDROCHLORIDE 36 MG/1
72 TABLET ORAL EVERY MORNING
Qty: 60 TABLET | Refills: 0 | Status: SHIPPED | OUTPATIENT
Start: 2024-06-20 | End: 2024-07-20

## 2024-06-20 NOTE — PROGRESS NOTES
Berry is a 23 year old who is being evaluated via a billable video visit.    How would you like to obtain your AVS? MyChart  If the video visit is dropped, the invitation should be resent by: Text to cell phone: [phone]  Will anyone else be joining your video visit? No      Assessment & Plan     Attention deficit hyperactivity disorder (ADHD), combined type  Helping with ADHD symptoms but causing side effects with decreased appetite and increased anxiety.  Advised patient he may want to consider trying Concerta 36 mg instead of 72 mg daily to see if this has less side effects for him but still also helps with his ADHD symptoms.  Patient agreed to follow-up in 1 month to review.  - methylphenidate HCl ER, OSM, (CONCERTA) 36 MG CR tablet; Take 2 tablets (72 mg) by mouth every morning for 30 days  - methylphenidate HCl ER, OSM, (CONCERTA) 36 MG CR tablet; Take 2 tablets (72 mg) by mouth every morning for 30 days  - methylphenidate HCl ER, OSM, (CONCERTA) 36 MG CR tablet; Take 2 tablets (72 mg) by mouth every morning for 30 days    Anxiety  Uncontrolled.  We need patient to take sertraline 50 mg daily instead of a few times a week for us to know if this medication at this dose will work to improve his anxiety.  Patient agrees to take medicine daily for the next month and follow-up in 1 month to review.  - sertraline (ZOLOFT) 50 MG tablet; Take 1 tablet (50 mg) by mouth daily          The longitudinal plan of care for the diagnosis(es)/condition(s) as documented were addressed during this visit. Due to the added complexity in care, I will continue to support Berry in the subsequent management and with ongoing continuity of care.            Subjective   Berry is a 23 year old, presenting for the following health issues:  Recheck Medication        6/20/2024    12:42 PM   Additional Questions   Roomed by [facility], Visit Facilitator     History of Present Illness       Reason for visit:  Med check    He eats 0-1  servings of fruits and vegetables daily.He consumes 3 sweetened beverage(s) daily.He exercises with enough effort to increase his heart rate 9 or less minutes per day.  He exercises with enough effort to increase his heart rate 3 or less days per week. He is missing 3 dose(s) of medications per week.  He is not taking prescribed medications regularly due to side effects.    ADHD: Works at [employer]. He takes Concerta 72 mg 3-4 times per week, when he works or goes out. He does get a decreased appetite when on the medication and also gets increased anxiety.  Patient has been on the same dose of Concerta for the last few years at least.    Anxiety: He take sertraline 50 mg 3-4 days per week, but having significant anxiety and becoming nauseated with vomiting before work shift.  He gets anxiety about being in social situations.  No significant depression or mood changes.  Patient has been on the same dose of sertraline for the last few years at least.            Review of Systems  Constitutional, HEENT, cardiovascular, pulmonary, gi and gu systems are negative, except as otherwise noted.      Objective           Vitals:  No vitals were obtained today due to virtual visit.    Physical Exam   GENERAL: alert and no distress  EYES: Eyes grossly normal to inspection.  No discharge or erythema, or obvious scleral/conjunctival abnormalities.  RESP: No audible wheeze, cough, or visible cyanosis.    SKIN: Visible skin clear. No significant rash, abnormal pigmentation or lesions.  NEURO: Cranial nerves grossly intact.  Mentation and speech appropriate for age.  PSYCH: Appropriate affect, tone, and pace of words          Video-Visit Details    Type of service:  Video Visit   Originating Location (pt. Location): Home    Distant Location (provider location):  On-site  Platform used for Video Visit: Jasmin  Signed Electronically by: Max Lopez MD

## 2024-06-20 NOTE — PROGRESS NOTES
"Berry is a 23 year old who is being evaluated via a billable video visit.    {ROOMING STAFF complete during rooming of virtual visit (Optional):014016}  {If patient encounters technical issues they should call [phone] :858751}    {PROVIDER CHARTING PREFERENCE:384857}    Subjective   Berry is a 23 year old, presenting for the following health issues:  Recheck Medication  {(!) Visit Details have not yet been documented.  Please enter Visit Details and then use this list to pull in documentation. (Optional):515279}  HPI     {SUPERLIST (Optional):325445}  {additonal problems for provider to add (Optional):097158}    {ROS Picklists (Optional):307630}      Objective           Vitals:  No vitals were obtained today due to virtual visit.    Physical Exam   {video visit exam brief selected:686983}    {Diagnostic Test Results (Optional):944055}      Video-Visit Details    Type of service:  Video Visit   Originating Location (pt. Location): {video visit patient location:996848::\"Home\"}  {PROVIDER LOCATION On-site should be selected for visits conducted from your clinic location or adjoining [facility] hospital, academic office, or other nearby [facility] building. Off-site should be selected for all other provider locations, including home:764397}  Distant Location (provider location):  {virtual location provider:610222}  Platform used for Video Visit: {Virtual Visit Platforms:184574::\"DySISmedical\"}  Signed Electronically by: Max Lopez MD  {Email feedback regarding this note to [email or web address]   :697436}  "

## 2024-09-22 DIAGNOSIS — F41.9 ANXIETY: ICD-10-CM

## 2024-09-26 ENCOUNTER — VIRTUAL VISIT (OUTPATIENT)
Dept: FAMILY MEDICINE | Facility: CLINIC | Age: 24
End: 2024-09-26
Payer: COMMERCIAL

## 2024-09-26 DIAGNOSIS — F41.9 ANXIETY: ICD-10-CM

## 2024-09-26 DIAGNOSIS — F90.2 ADHD (ATTENTION DEFICIT HYPERACTIVITY DISORDER), COMBINED TYPE: Primary | ICD-10-CM

## 2024-09-26 PROCEDURE — 99214 OFFICE O/P EST MOD 30 MIN: CPT | Mod: 95 | Performed by: FAMILY MEDICINE

## 2024-09-26 PROCEDURE — G2211 COMPLEX E/M VISIT ADD ON: HCPCS | Mod: 95 | Performed by: FAMILY MEDICINE

## 2024-09-26 RX ORDER — METHYLPHENIDATE HYDROCHLORIDE 36 MG/1
36 TABLET ORAL DAILY
Qty: 30 TABLET | Refills: 0 | Status: SHIPPED | OUTPATIENT
Start: 2024-09-26 | End: 2024-10-26

## 2024-09-26 RX ORDER — SERTRALINE HYDROCHLORIDE 100 MG/1
100 TABLET, FILM COATED ORAL DAILY
Qty: 90 TABLET | Refills: 3 | Status: SHIPPED | OUTPATIENT
Start: 2024-09-26

## 2024-09-26 RX ORDER — METHYLPHENIDATE HYDROCHLORIDE 36 MG/1
36 TABLET ORAL DAILY
Qty: 30 TABLET | Refills: 0 | Status: SHIPPED | OUTPATIENT
Start: 2024-11-25 | End: 2024-12-25

## 2024-09-26 RX ORDER — METHYLPHENIDATE HYDROCHLORIDE 36 MG/1
36 TABLET ORAL DAILY
Qty: 30 TABLET | Refills: 0 | Status: SHIPPED | OUTPATIENT
Start: 2024-10-26 | End: 2024-11-25

## 2024-09-26 ASSESSMENT — ANXIETY QUESTIONNAIRES
GAD7 TOTAL SCORE: 14
8. IF YOU CHECKED OFF ANY PROBLEMS, HOW DIFFICULT HAVE THESE MADE IT FOR YOU TO DO YOUR WORK, TAKE CARE OF THINGS AT HOME, OR GET ALONG WITH OTHER PEOPLE?: VERY DIFFICULT
GAD7 TOTAL SCORE: 14
GAD7 TOTAL SCORE: 14
7. FEELING AFRAID AS IF SOMETHING AWFUL MIGHT HAPPEN: MORE THAN HALF THE DAYS

## 2024-09-26 NOTE — PROGRESS NOTES
Berry is a 24 year old who is being evaluated via a billable video visit.    How would you like to obtain your AVS? MyChart  If the video visit is dropped, the invitation should be resent by: Text to cell phone: [phone]  Will anyone else be joining your video visit? No      Assessment & Plan     ADHD (attention deficit hyperactivity disorder), combined type  Controlled with methylphenidate 36 mg daily.  3-month prescription sent to pharmacy.  Advised patient to follow-up when he is due for additional refills, which may be around 4-5 months because he is not using it daily.  - methylphenidate HCl ER, OSM, (CONCERTA) 36 MG CR tablet; Take 1 tablet (36 mg) by mouth daily.  - methylphenidate HCl ER, OSM, (CONCERTA) 36 MG CR tablet; Take 1 tablet (36 mg) by mouth daily.  - methylphenidate HCl ER, OSM, (CONCERTA) 36 MG CR tablet; Take 1 tablet (36 mg) by mouth daily.    Anxiety  Improving but still uncontrolled.  Will increase sertraline from 50 mg a day to 100 mg daily.  Follow-up in 4 to 5 months or sooner if there are any concerns.  - sertraline (ZOLOFT) 100 MG tablet; Take 1 tablet (100 mg) by mouth daily.        The longitudinal plan of care for the diagnosis(es)/condition(s) as documented were addressed during this visit. Due to the added complexity in care, I will continue to support Berry in the subsequent management and with ongoing continuity of care.        Subjective   Berry is a 24 year old, presenting for the following health issues: ADHD and anxiety    History of Present Illness       Mental Health Follow-up:  Patient presents to follow-up on Anxiety.    Patient's anxiety since last visit has been:  Medium  The patient is not having other symptoms associated with anxiety.  Any significant life events: No  Patient is feeling anxious or having panic attacks.  Patient has no concerns about alcohol or drug use.    He eats 0-1 servings of fruits and vegetables daily.He consumes 1 sweetened beverage(s)  daily.He exercises with enough effort to increase his heart rate 30 to 60 minutes per day.  He exercises with enough effort to increase his heart rate 3 or less days per week.   He is taking medications regularly.     ADHD: He is taking Concerta 36 mg 4 days per week, while working, instead of 72 mg daily, which is working well in helping with him focus/concentration.    Anxiety: He is on sertraline 50 mg daily instead of 3-4 days per week, which has been helping because he is not getting nauseated before work, his social anxiety has improved. He would like to increase sertraline dose because he had an anxiety attack 2 months ago. No obvious stress that caused anxiety attack.          Review of Systems  Constitutional, HEENT, cardiovascular, pulmonary, gi and gu systems are negative, except as otherwise noted.      Objective           Vitals:  No vitals were obtained today due to virtual visit.    Physical Exam   GENERAL: alert and no distress  EYES: Eyes grossly normal to inspection.  No discharge or erythema, or obvious scleral/conjunctival abnormalities.  RESP: No audible wheeze, cough, or visible cyanosis.    SKIN: Visible skin clear. No significant rash, abnormal pigmentation or lesions.  NEURO: Cranial nerves grossly intact.  Mentation and speech appropriate for age.  PSYCH: Appropriate affect, tone, and pace of words          Video-Visit Details    Type of service:  Video Visit   Originating Location (pt. Location): Home    Distant Location (provider location):  On-site  Platform used for Video Visit: Jasmin  Signed Electronically by: Max Lopez MD

## 2025-07-03 ENCOUNTER — OFFICE VISIT (OUTPATIENT)
Dept: FAMILY MEDICINE | Facility: CLINIC | Age: 25
End: 2025-07-03
Payer: COMMERCIAL

## 2025-07-03 VITALS
BODY MASS INDEX: 22.85 KG/M2 | HEIGHT: 68 IN | SYSTOLIC BLOOD PRESSURE: 98 MMHG | RESPIRATION RATE: 12 BRPM | TEMPERATURE: 97.4 F | WEIGHT: 150.8 LBS | HEART RATE: 66 BPM | OXYGEN SATURATION: 97 % | DIASTOLIC BLOOD PRESSURE: 61 MMHG

## 2025-07-03 DIAGNOSIS — F41.9 ANXIETY: ICD-10-CM

## 2025-07-03 DIAGNOSIS — F90.2 ADHD (ATTENTION DEFICIT HYPERACTIVITY DISORDER), COMBINED TYPE: ICD-10-CM

## 2025-07-03 DIAGNOSIS — Z00.00 ANNUAL PHYSICAL EXAM: Primary | ICD-10-CM

## 2025-07-03 RX ORDER — METHYLPHENIDATE HYDROCHLORIDE 54 MG/1
54 TABLET ORAL EVERY MORNING
Qty: 30 TABLET | Refills: 0 | Status: SHIPPED | OUTPATIENT
Start: 2025-07-03

## 2025-07-03 RX ORDER — SERTRALINE HYDROCHLORIDE 100 MG/1
200 TABLET, FILM COATED ORAL DAILY
Qty: 180 TABLET | Refills: 3 | Status: SHIPPED | OUTPATIENT
Start: 2025-07-03

## 2025-07-03 RX ORDER — DOXYCYCLINE HYCLATE 100 MG
TABLET ORAL
COMMUNITY
Start: 2025-05-19 | End: 2025-07-03

## 2025-07-03 SDOH — HEALTH STABILITY: PHYSICAL HEALTH: ON AVERAGE, HOW MANY DAYS PER WEEK DO YOU ENGAGE IN MODERATE TO STRENUOUS EXERCISE (LIKE A BRISK WALK)?: 3 DAYS

## 2025-07-03 ASSESSMENT — SOCIAL DETERMINANTS OF HEALTH (SDOH): HOW OFTEN DO YOU GET TOGETHER WITH FRIENDS OR RELATIVES?: ONCE A WEEK

## 2025-07-03 ASSESSMENT — PATIENT HEALTH QUESTIONNAIRE - PHQ9
SUM OF ALL RESPONSES TO PHQ QUESTIONS 1-9: 6
SUM OF ALL RESPONSES TO PHQ QUESTIONS 1-9: 6
10. IF YOU CHECKED OFF ANY PROBLEMS, HOW DIFFICULT HAVE THESE PROBLEMS MADE IT FOR YOU TO DO YOUR WORK, TAKE CARE OF THINGS AT HOME, OR GET ALONG WITH OTHER PEOPLE: NOT DIFFICULT AT ALL

## 2025-07-03 NOTE — PATIENT INSTRUCTIONS
Patient Education   Preventive Care Advice   This is general advice given by our system to help you stay healthy. However, your care team may have specific advice just for you. Please talk to your care team about your preventive care needs.  Nutrition  Eat 5 or more servings of fruits and vegetables each day.  Try wheat bread, brown rice and whole grain pasta (instead of white bread, rice, and pasta).  Get enough calcium and vitamin D. Check the label on foods and aim for 100% of the RDA (recommended daily allowance).  Lifestyle  Exercise at least 150 minutes each week  (30 minutes a day, 5 days a week).  Do muscle strengthening activities 2 days a week. These help control your weight and prevent disease.  No smoking.  Wear sunscreen to prevent skin cancer.  Have a dental exam and cleaning every 6 months.  Yearly exams  See your health care team every year to talk about:  Any changes in your health.  Any medicines your care team has prescribed.  Preventive care, family planning, and ways to prevent chronic diseases.  Shots (vaccines)   HPV shots (up to age 26), if you've never had them before.  Hepatitis B shots (up to age 59), if you've never had them before.  COVID-19 shot: Get this shot when it's due.  Flu shot: Get a flu shot every year.  Tetanus shot: Get a tetanus shot every 10 years.  Pneumococcal, hepatitis A, and RSV shots: Ask your care team if you need these based on your risk.  Shingles shot (for age 50 and up)  General health tests  Diabetes screening:  Starting at age 35, Get screened for diabetes at least every 3 years.  If you are younger than age 35, ask your care team if you should be screened for diabetes.  Cholesterol test: At age 39, start having a cholesterol test every 5 years, or more often if advised.  Bone density scan (DEXA): At age 50, ask your care team if you should have this scan for osteoporosis (brittle bones).  Hepatitis C: Get tested at least once in your life.  STIs (sexually  transmitted infections)  Before age 24: Ask your care team if you should be screened for STIs.  After age 24: Get screened for STIs if you're at risk. You are at risk for STIs (including HIV) if:  You are sexually active with more than one person.  You don't use condoms every time.  You or a partner was diagnosed with a sexually transmitted infection.  If you are at risk for HIV, ask about PrEP medicine to prevent HIV.  Get tested for HIV at least once in your life, whether you are at risk for HIV or not.  Cancer screening tests  Cervical cancer screening: If you have a cervix, begin getting regular cervical cancer screening tests starting at age 21.  Breast cancer scan (mammogram): If you've ever had breasts, begin having regular mammograms starting at age 40. This is a scan to check for breast cancer.  Colon cancer screening: It is important to start screening for colon cancer at age 45.  Have a colonoscopy test every 10 years (or more often if you're at risk) Or, ask your provider about stool tests like a FIT test every year or Cologuard test every 3 years.  To learn more about your testing options, visit:   .  For help making a decision, visit:   https://bit.ly/ai63042.  Prostate cancer screening test: If you have a prostate, ask your care team if a prostate cancer screening test (PSA) at age 55 is right for you.  Lung cancer screening: If you are a current or former smoker ages 50 to 80, ask your care team if ongoing lung cancer screenings are right for you.  For informational purposes only. Not to replace the advice of your health care provider. Copyright   2023 [facility]. All rights reserved. Clinically reviewed by the [facility]. [employer] [id number] - REV 01/24.  Learning About Stress  What is stress?     Stress is your body's response to a hard situation. Your body can have a physical, emotional, or mental response. Stress is a fact of life for most people, and it  affects everyone differently. What causes stress for you may not be stressful for someone else.  A lot of things can cause stress. You may feel stress when you go on a job interview, take a test, or run a race. This kind of short-term stress is normal and even useful. It can help you if you need to work hard or react quickly. For example, stress can help you finish an important job on time.  Long-term stress is caused by ongoing stressful situations or events. Examples of long-term stress include long-term health problems, ongoing problems at work, or conflicts in your family. Long-term stress can harm your health.  How does stress affect your health?  When you are stressed, your body responds as though you are in danger. It makes hormones that speed up your heart, make you breathe faster, and give you a burst of energy. This is called the fight-or-flight stress response. If the stress is over quickly, your body goes back to normal and no harm is done.  But if stress happens too often or lasts too long, it can have bad effects. Long-term stress can make you more likely to get sick, and it can make symptoms of some diseases worse. If you tense up when you are stressed, you may develop neck, shoulder, or low back pain. Stress is linked to high blood pressure and heart disease.  Stress also harms your emotional health. It can make you talbot, tense, or depressed. Your relationships may suffer, and you may not do well at work or school.  What can you do to manage stress?  You can try these things to help manage stress:   Do something active. Exercise or activity can help reduce stress. Walking is a great way to get started. Even everyday activities such as housecleaning or yard work can help.  Try yoga or jovanny chi. These techniques combine exercise and meditation. You may need some training at first to learn them.  Do something you enjoy. For example, listen to music or go to a movie. Practice your hobby or do volunteer  "work.  Meditate. This can help you relax, because you are not worrying about what happened before or what may happen in the future.  Do guided imagery. Imagine yourself in any setting that helps you feel calm. You can use online videos, books, or a teacher to guide you.  Do breathing exercises. For example:  From a standing position, bend forward from the waist with your knees slightly bent. Let your arms dangle close to the floor.  Breathe in slowly and deeply as you return to a standing position. Roll up slowly and lift your head last.  Hold your breath for just a few seconds in the standing position.  Breathe out slowly and bend forward from the waist.  Let your feelings out. Talk, laugh, cry, and express anger when you need to. Talking with supportive friends or family, a counselor, or a tommie leader about your feelings is a healthy way to relieve stress. Avoid discussing your feelings with people who make you feel worse.  Write. It may help to write about things that are bothering you. This helps you find out how much stress you feel and what is causing it. When you know this, you can find better ways to cope.  What can you do to prevent stress?  You might try some of these things to help prevent stress:  Manage your time. This helps you find time to do the things you want and need to do.  Get enough sleep. Your body recovers from the stresses of the day while you are sleeping.  Get support. Your family, friends, and community can make a difference in how you experience stress.  Limit your news feed. Avoid or limit time on social media or news that may make you feel stressed.  Do something active. Exercise or activity can help reduce stress. Walking is a great way to get started.  Where can you learn more?  Go to https://www.[employer].net/patiented  Enter N032 in the search box to learn more about \"Learning About Stress.\"  Current as of: October 24, 2024  Content Version: 14.5 2024-2025 [employer].   Care instructions adapted under license by your healthcare professional. If you have questions about a medical condition or this instruction, always ask your healthcare professional. [employer] disclaims any warranty or liability for your use of this information.    Learning About Depression Screening  What is depression screening?  Depression screening is a way to see if you have depression symptoms. It may be done by a doctor or counselor. It's often part of a routine checkup. That's because your mental health is just as important as your physical health.  Depression is a mental health condition that affects how you feel, think, and act. You may:  Have less energy.  Lose interest in your daily activities.  Feel sad and grouchy for a long time.  Depression is very common. It affects people of all ages.  Many things can lead to depression. Some people become depressed after they have a stroke or find out they have a major illness like cancer or heart disease. The death of a loved one or a breakup may lead to depression. It can run in families. Most experts believe that a combination of inherited genes and stressful life events can cause it.  What happens during screening?  You may be asked to fill out a form about your depression symptoms. You and the doctor will discuss your answers. The doctor may ask you more questions to learn more about how you think, act, and feel.  What happens after screening?  If you have symptoms of depression, your doctor will talk to you about your options.  Doctors usually treat depression with medicines or counseling. Often, combining the two works best. Many people don't get help because they think that they'll get over the depression on their own. But people with depression may not get better unless they get treatment.  The cause of depression is not well understood. There may be many factors involved. But if you have depression, it's not your fault.  A serious  "symptom of depression is thinking about death or suicide. If you or someone you care about talks about this or about feeling hopeless, get help right away.  It's important to know that depression can be treated. Medicine, counseling, and self-care may help.  Where can you learn more?  Go to https://www.[employer].net/patiented  Enter T185 in the search box to learn more about \"Learning About Depression Screening.\"  Current as of: July 31, 2024  Content Version: 14.5    8161-6775 [employer].   Care instructions adapted under license by your healthcare professional. If you have questions about a medical condition or this instruction, always ask your healthcare professional. [employer] disclaims any warranty or liability for your use of this information.       "
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
## 2025-07-07 ENCOUNTER — PATIENT OUTREACH (OUTPATIENT)
Dept: CARE COORDINATION | Facility: CLINIC | Age: 25
End: 2025-07-07
Payer: COMMERCIAL

## 2025-09-02 ENCOUNTER — MYC REFILL (OUTPATIENT)
Dept: FAMILY MEDICINE | Facility: CLINIC | Age: 25
End: 2025-09-02
Payer: COMMERCIAL

## 2025-09-02 DIAGNOSIS — F90.2 ADHD (ATTENTION DEFICIT HYPERACTIVITY DISORDER), COMBINED TYPE: ICD-10-CM

## 2025-09-03 RX ORDER — METHYLPHENIDATE HYDROCHLORIDE 54 MG/1
54 TABLET ORAL EVERY MORNING
Qty: 30 TABLET | Refills: 0 | Status: SHIPPED | OUTPATIENT
Start: 2025-09-03